# Patient Record
Sex: FEMALE | Race: BLACK OR AFRICAN AMERICAN | URBAN - METROPOLITAN AREA
[De-identification: names, ages, dates, MRNs, and addresses within clinical notes are randomized per-mention and may not be internally consistent; named-entity substitution may affect disease eponyms.]

---

## 2017-08-08 ENCOUNTER — HISTORICAL (OUTPATIENT)
Dept: ADMINISTRATIVE | Facility: HOSPITAL | Age: 82
End: 2017-08-08

## 2022-04-28 NOTE — OP NOTE
DATE OF SURGERY:8-8-17    SURGEON:  Marleny Enriquez MD    PREOPERATIVE DIAGNOSIS: Open angle glaucoma left eye    POSTOPERATIVE DIAGNOSIS:  Same status post procedure.    OPERATION: Trabeculectomy with Mitomycin-C left eye w/ Express shunt      Anesthesia:  General IV.  Complications:  None.  Estimated blood loss:  Less than 1 cc.    INDICATION FOR PROCEDURE:  The patient was evaluated in clinic and noted to have elevated intraocular pressure and progression of visual field despite maximum medical therapy.  Options were discussed with the patient and family and patient elected for ExPress implant in the operative  eye.  Risks, benefits and alternatives were explained.  Informed consent was obtained from the patient in clinic.    PROCEDURE IN DETAIL:  The patient was brought into the OR and laid in supine position.  General IV anesthesia was undertaken without complication.  The lids and the eye were prepped in usual manner for intraocular surgery.  A wire lid speculum was placed in the operative eye for adequate exposure to the surgical site.      A 7-0 Vicryl suture was placed in the cornea superiorly and the eye moved inferiorly for exposure to the surgical site.  A small peritomy was made superiorly and a mixture of preservative free lidocaine and epinephrine was injected into the sub Tenon's space for further anesthesia.  The incision was then continued nasally for approximately 5 mm.  The conjunctiva was undermined posteriorly with blunt dissection.  Wet-Field cautery was used for hemostasis.  A 4 x 3 mm partial thickness flap was made in the sclera using a crescent blade.    A paracentesis was made temporally and a small amount of Healon was injected into the anterior chamber.A 23 gauge needle was used to make an entry into the AC. The Express shunt was placed and sat in good position in front of the iris. The flap was secured with 4 10-0 nylon sutures. The AC was reinflated with BSS in stable form.   There was a small amount of fluid leakage from the wound which was ideal for this surgery.  The conjunctiva was closed with interrupted and mattress 10-0 nylon suture.  BSS was injected AC and the fluid was noted to efflux into the conjunctiva. 0.1 cc of MMC (0.4 mg/ml) was injected under the conjunctiva. The IOP was judged to be physiologic.  The wounds were watertight.  Postop injections were given and wire lid speculum was removed, Maxitrolointment applied to the operative eye followed by a     pressure patch and shield.  The patient left the operating room in stable condition, having tolerated the surgery well.